# Patient Record
Sex: MALE | Race: WHITE
[De-identification: names, ages, dates, MRNs, and addresses within clinical notes are randomized per-mention and may not be internally consistent; named-entity substitution may affect disease eponyms.]

---

## 2015-08-01 VITALS
DIASTOLIC BLOOD PRESSURE: 80 MMHG | SYSTOLIC BLOOD PRESSURE: 127 MMHG | DIASTOLIC BLOOD PRESSURE: 80 MMHG | SYSTOLIC BLOOD PRESSURE: 127 MMHG | DIASTOLIC BLOOD PRESSURE: 80 MMHG | SYSTOLIC BLOOD PRESSURE: 127 MMHG

## 2017-08-15 ENCOUNTER — HOSPITAL ENCOUNTER (OUTPATIENT)
Dept: HOSPITAL 44 - CARD | Age: 62
End: 2017-08-15
Attending: INTERNAL MEDICINE
Payer: COMMERCIAL

## 2017-08-15 DIAGNOSIS — E11.9: ICD-10-CM

## 2017-08-15 DIAGNOSIS — I25.10: Primary | ICD-10-CM

## 2017-08-15 DIAGNOSIS — E78.5: ICD-10-CM

## 2017-08-15 PROCEDURE — 99213 OFFICE O/P EST LOW 20 MIN: CPT

## 2017-09-18 ENCOUNTER — HOSPITAL ENCOUNTER (OUTPATIENT)
Dept: HOSPITAL 44 - OPSURG | Age: 62
End: 2017-09-18
Attending: INTERNAL MEDICINE
Payer: COMMERCIAL

## 2017-09-18 DIAGNOSIS — E11.9: ICD-10-CM

## 2017-09-18 DIAGNOSIS — Z12.11: Primary | ICD-10-CM

## 2017-09-18 DIAGNOSIS — K59.00: ICD-10-CM

## 2017-09-18 DIAGNOSIS — K59.8: ICD-10-CM

## 2017-09-18 DIAGNOSIS — E66.8: ICD-10-CM

## 2017-09-18 DIAGNOSIS — E88.81: ICD-10-CM

## 2017-09-18 DIAGNOSIS — I10: ICD-10-CM

## 2017-09-18 PROCEDURE — S1016 NON-PVC INTRAVENOUS ADMINIST: HCPCS

## 2017-09-18 PROCEDURE — 45378 DIAGNOSTIC COLONOSCOPY: CPT

## 2017-09-19 NOTE — GI REPORT
REFERRING PHYSICIAN:

Dr. Stan Oneill



GASTROENTEROLOGIST: 

Donald Gerhardt, MD



PROCEDURE MEDICATION:

Propofol as per anesthesia.



INDICATIONS: 

This 62-year-old man is referred for a screening colonoscopy.  He does have 
heart stents.  He has been off Plavix for 5 days.  He is a diabetic.  He has 
metabolic syndrome with central obesity, hypertension, and a lipid disorder.   
He is 5 feet 11 inches and weighs 100 kilograms and carries that centrally.  He 
has some tendency towards constipation.  He denies a family history of 
colorectal cancer. 



PROCEDURE PERFORMED: 

Colonoscopy.



PROCEDURE: 

An Olympus video colonoscope was advanced through the rectum.  The prep was 
fair but we did lavage and suction and we were able to finally get to the 
cecum.  A very atonic redundant colon.  The appendiceal orifice and terminal 
ileum were normal.  On slow withdrawal, the cecum, ascending colon, and 
transverse had no obvious intraluminal lesions noted, though it was not a 
perfect prep.  Descending colon and sigmoid, again, a very redundant colon.  It 
was not a good prep.  No obvious intraluminal lesions noted, though small 
polyps could be missed.   Retroflexion of the rectum was normal.  Patient 
tolerated the procedure well. 



FINDINGS: 

1.   Atonic redundant colon. 

2.   No obvious intraluminal lesions noted as best as we could see with lavage 
and suction, though the prep was not ideal.



RECOMMENDATIONS: 

1.   He can restart his Plavix, since we did not remove anything today.

2.   Go back on his diabetic diet. 

3.   Again, from a colon standpoint, more fiber, fruits, vegetables, etc. are 
beneficial.  White carbohydrates, which are sugar, are not beneficial for the 
colon.

4.   Consider re-looking at his colon in 5 to 10 years.







cc:   Dr. Stan DELATORRE

## 2018-10-23 ENCOUNTER — HOSPITAL ENCOUNTER (OUTPATIENT)
Dept: HOSPITAL 44 - LAB | Age: 63
End: 2018-10-23
Attending: FAMILY MEDICINE
Payer: MEDICARE

## 2018-10-23 DIAGNOSIS — E78.5: ICD-10-CM

## 2018-10-23 DIAGNOSIS — E11.9: Primary | ICD-10-CM

## 2018-10-23 PROCEDURE — 83036 HEMOGLOBIN GLYCOSYLATED A1C: CPT

## 2018-10-23 PROCEDURE — 36415 COLL VENOUS BLD VENIPUNCTURE: CPT

## 2018-10-23 PROCEDURE — 80061 LIPID PANEL: CPT
